# Patient Record
Sex: MALE | Race: WHITE | NOT HISPANIC OR LATINO | URBAN - METROPOLITAN AREA
[De-identification: names, ages, dates, MRNs, and addresses within clinical notes are randomized per-mention and may not be internally consistent; named-entity substitution may affect disease eponyms.]

---

## 2017-03-31 ENCOUNTER — TRANSCRIBE ORDERS (OUTPATIENT)
Dept: ADMINISTRATIVE | Facility: HOSPITAL | Age: 22
End: 2017-03-31

## 2017-03-31 ENCOUNTER — ALLSCRIPTS OFFICE VISIT (OUTPATIENT)
Dept: OTHER | Facility: OTHER | Age: 22
End: 2017-03-31

## 2017-03-31 DIAGNOSIS — G43.709 CHRONIC MIGRAINE WITHOUT AURA WITHOUT STATUS MIGRAINOSUS, NOT INTRACTABLE: ICD-10-CM

## 2017-03-31 DIAGNOSIS — E55.9 VITAMIN D DEFICIENCY: ICD-10-CM

## 2017-03-31 DIAGNOSIS — G43.701 CHRONIC MIGRAINE WITHOUT AURA WITH STATUS MIGRAINOSUS, NOT INTRACTABLE: Primary | ICD-10-CM

## 2017-04-14 ENCOUNTER — HOSPITAL ENCOUNTER (OUTPATIENT)
Dept: RADIOLOGY | Facility: HOSPITAL | Age: 22
Discharge: HOME | End: 2017-04-14
Attending: PSYCHIATRY & NEUROLOGY
Payer: COMMERCIAL

## 2017-04-14 DIAGNOSIS — G43.709 CHRONIC MIGRAINE WITHOUT AURA WITHOUT STATUS MIGRAINOSUS, NOT INTRACTABLE: ICD-10-CM

## 2017-04-14 PROCEDURE — 70551 MRI BRAIN STEM W/O DYE: CPT

## 2017-06-23 ENCOUNTER — ALLSCRIPTS OFFICE VISIT (OUTPATIENT)
Dept: OTHER | Facility: OTHER | Age: 22
End: 2017-06-23

## 2017-09-22 ENCOUNTER — GENERIC CONVERSION - ENCOUNTER (OUTPATIENT)
Dept: OTHER | Facility: OTHER | Age: 22
End: 2017-09-22

## 2018-01-10 ENCOUNTER — ALLSCRIPTS OFFICE VISIT (OUTPATIENT)
Dept: OTHER | Facility: OTHER | Age: 23
End: 2018-01-10

## 2018-01-13 NOTE — PROGRESS NOTES
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 1)    Plan    Chronic migraine without aura, Chronic tension type headache    · Protriptyline HCl - 5 MG Oral Tablet; 1 tablet QAM   Rx By: George Maxwell; Dispense: 0 Days ; #:30 Tablet; Refill: 2;For: Chronic migraine without aura, Chronic tension type headache; NUBIA = N; Verified Transmission to [id number] Last Updated By: System, [employer]; 1/10/2018 9:33:18 AM  Chronic tension type headache    · Butalbital-APAP-Caffeine -40 MG Oral Capsule; TAKE 1 TO 2 CAPSULES    EVERY 4 TO 6 HOURS AS NEEDED FOR PAIN   Rx By: George Maxwell; Dispense: 1 Days ; #:10 Capsule; Refill: 0;For: Chronic tension type headache; NUBIA = N; Call Rx      Follow-up visit in 6 months Evaluation and Treatment  Follow-up  Status: Hold For - Scheduling  Requested for: [id number]     Ordered; For: Chronic migraine without aura, Chronic tension type headache, Health Maintenance;  Ordered By: George Maxwell  Performed:   Due: [id number]; Last Updated By: Dyana Prescott; 1/10/2018 10:25:39 AM       Discussion/Summary   Discussion Summary:    Since nortriptyline causes sedation, I suggested to switch to protriptyline since the side effect profile does not usually include sedation  He agreed and I reviewed the side effects with him  He was encouraged to call back within the next few days this is not helpful or causes side effects  At the onset of a headache he will continue Excedrin migraine over-the-counter, and encouraged to limit this to 1-2 per week at the most to avoid medication overuse headache  He can take Fioricet if Excedrin fails  reports headaches are worse when he has a cold or sinus complaints; his brain MRI notes a left maxillary cyst in some mucosal inflammation  If this is persistent despite protriptyline we will have him see ENT     patient was encouraged to call the office with any questions or concerns  6 months  Patient's Capacity to Self-Care: Patient is able to Self-Care  Medication SE Review and Pt Understands Tx: Possible side effects of new medications were reviewed with the patient/guardian today  The treatment plan was reviewed with the patient/guardian  The patient/guardian understands and agrees with the treatment plan    Patient Guardian understands agrees: The treatment plan was reviewed with the patient/guardian  The patient/guardian understands and agrees with the treatment plan    Headache [place]:      The patient was counseled regarding;    Discussed side effects of all medications prescribed today to the patient in detail  See above  Patient education was completed today and we also discussed precautions for rebound headaches  --       When patient has a moderate to severe headache, they should seek rest, initiate relaxation and apply cold compresses to the head  Also recommended to the patient :  1  Maintain regular sleep schedule -- 2  Limit over the counter medications  (No more than 3 times a week)  -- 3  Maintain headache diary  -- 4  Limit caffeine to 1-2 cups a day or less  -- 5  Avoid dietary trigger  (list given to the patient and reviewed with them)  -- 6  Patient is to have regular frequent meals to prevent headache onset  Chief Complaint   Chief Complaint Free Text Note Form: Patient presents for a follow-up for chronic headache  History of Present Illness   HPI: We had the pleasure of evaluating Shauna Allison in neurological follow up today  As you know he is a 23year old right handed male  He is a  and is here today for evaluation of his headaches  last seen the patient stop nortriptyline and started Flexeril which was not effective  He restarted nortriptyline at 25 mg since the 50 mg capsule caused excessive sedation   Since restarting nortriptyline 25 mg he notes slight sedation during the day but his headaches are much improved From 4 times per week to 1 or 2 per week  At the onset of headache he typically takes Excedrin migraine which resolves the headache and it does not evolve into a migraine with nausea  He missed 1-2 days of work since last seen for headache, but overall he has been doing fairly well  a predictable trigger is when he gets a cold or viral infection  His headaches typically always get worse when he gets a cold  MRI thankfully is unremarkable, but there are some cysts and notable mucosal inflammation in the sinuses, particularly the left maxillary sinus  His headaches are usually in the frontal region any does not note any pain or pressure in the maxillary regions  is your current pain level â 0/10, no headache today started at what age â when he was 3years of age and got better when he was in high school and then got worse two years ago  He states he has headaches at time daily and then at times none  or injury prior to onset of headaches â yes, when he was in high school  Does not recall having headaches at that time   often do the headaches occur â 1-2 per week time of the day do the headaches start â usually more towards the afternoon/ evening long do the headaches last - until he takes Excedrin which usually helps you ever headache free - yes are they located - forehead, maybe into the temples at times is the intensity of pain â average 4-5/10, max 10/10- gets up to 10/10 if he does not take Excedrin warning prior to headache and how long do they last - none your usual headache - pain that is there, pressure sensation symptoms:  Decrease of appetite Photophobia, phonophobia Prefer to be alone and in a dark room, unable to work are worse if the patient: none trigger: missing meals possibly, when he gets a cold time of the year do headaches occur more frequently - possibly winter when he gets sick or a cold you seen someone else for headaches or pain â urgent care (does not have pcp)   medications do you take or have you taken for your headaches? B2 and mag, nortriptyline, flexeril Excedrin migraine, Tylenol, ibuprofen Treatments used in the past for headaches: none     of systems, Past medical history, Surgical history, Family history, Social history and Medication history were all reviewed today  Review of Systems   Neurological ROS:      Constitutional: no fever, no chills, no recent weight gain, no recent weight loss, no complaints of feeling tired, no changes in appetite  HEENT:  no sinus problems, not feeling congested, no blurred vision, no dryness of the eyes, no eye pain, no hearing loss, no tinnitus, no mouth sores, no sore throat, no hoarseness, no dysphagia, no masses, no bleeding  Cardiovascular:  no chest pain or pressure, no palpitations present, the heart rate was not rapid or irregular, no swelling in the arms or legs, no poor circulation  Respiratory:  no unusual or persistant cough, no shortness of breath with or without exertion  Gastrointestinal:  no nausea, no vomiting, no diarrhea, no abdominal pain, no changes in bowel habits, no melena, no loss of bowel control  Genitourinary:  no incontinence, no feelings of urinary urgency, no increase in frequency, no urinary hesitancy, no dysuria, no hematuria  Musculoskeletal:  no arthralgias, no myalgias, no immobility or loss of function, no head/neck/back pain, no pain while walking  Integumentary  no masses, no rash, no skin lesions, no livedo reticularis  Psychiatric:  no anxiety, no depression, no mood swings, no psychiatric hospitalizations, no sleep problems  Endocrine  no unusual weight loss or gain, no excessive urination, no excessive thirst, no hair loss or gain, no hot or cold intolerance, no menstrual period change or irregularity, no loss of sexual ability or drive, no erection difficulty, no nipple discharge  Hematologic/Lymphatic:  no unusual bleeding, no tendency for easy bruising, no clotting skin or lumps  Neurological General: headache  Neurological Mental Status:  no confusion, no mood swings, no alteration or loss of consciousness, no difficulty expressing/understanding speech, no memory problems  Neurological Cranial Nerves:  no blurry or double vision, no loss of vision, no face drooping, no facial numbness or weakness, no taste or smell loss/changes, no hearing loss or ringing, no vertigo or dizziness, no dysphagia, no slurred speech  Neurological Motor findings include:  no tremor, no twitching, no cramping(pre/post exercise), no atrophy  Neurological Coordination:  no unsteadiness, no vertigo or dizziness, no clumsiness, no problems reaching for objects  Neurological Sensory:  no numbness, no pain, no tingling, does not fall when eyes closed or taking a shower  Neurological Gait:  no difficulty walking, not falling to one side, no sensation of being pushed, has not had falls  ROS Reviewed:    ROS reviewed  Active Problems   1  Chronic migraine without aura (346 70) (G43 709)   2  Chronic tension type headache (339 12) (G44 229)   3  Low vitamin D level (268 9) (E55 9)    Current Meds    1  Butalbital-APAP-Caffeine -40 MG Oral Capsule; TAKE 1 TO 2 CAPSULES EVERY     4 TO 6 HOURS AS NEEDED FOR PAIN;     Therapy: 22Sep2017 to (Evaluate:23Sep2017); Last Rx:22Sep2017 Ordered   2  Vitamin B-2 100 MG Oral Tablet; Therapy: (Recorded:23Jun2017) to Recorded    Allergies   1   No Known Drug Allergies    Vitals   Signs   Recorded: [id number] 09:23AM   Heart Rate: 80  Respiration: 14  Systolic: 244  Diastolic: 80  Height: 6 ft 5 in  Weight: 199 lb   BMI Calculated: 23 6  BSA Calculated: 2 23  O2 Saturation: 99    Physical Exam        Constitutional      General appearance: No acute distress, well appearing and well nourished  -- (Slight eyelid edema bilaterally) well developed-- and-- well nourished  Musculoskeletal      Gait and station: Normal gait, stance and balance  Gait evaluation demonstrated a normal gait  Muscle strength: Normal strength throughout  Muscle tone: No atrophy, abnormal movements, flaccidity, cogwheeling or spasticity  Neurologic      Language: Names objects, able to repeat phrases and speaks spontaneously  Language: The evaluation was normal       2nd cranial nerve: Normal        3rd, 4th, and 6th cranial nerves: Normal        5th cranial nerve: Normal        7th cranial nerve: Normal        8th cranial nerve: Normal        9th cranial nerve: Normal        11th cranial nerve: Normal        12th cranial nerve: Normal        Sensation: Normal        Reflexes: Normal        Coordination: Normal        Mood and affect: Normal  -- appropriate  Signatures    Electronically signed by :  Rg Fritz [facility]; Petr 10 2018 10:36AM EST                       (Author)     Electronically signed by : Derek Crooks MD; Jan 12 2018  9:15AM EST                       (Author)

## 2018-01-14 VITALS
WEIGHT: 199.25 LBS | OXYGEN SATURATION: 94 % | SYSTOLIC BLOOD PRESSURE: 123 MMHG | HEART RATE: 77 BPM | DIASTOLIC BLOOD PRESSURE: 62 MMHG | RESPIRATION RATE: 16 BRPM

## 2018-01-14 VITALS
OXYGEN SATURATION: 99 % | SYSTOLIC BLOOD PRESSURE: 120 MMHG | DIASTOLIC BLOOD PRESSURE: 70 MMHG | WEIGHT: 194.5 LBS | HEART RATE: 72 BPM

## 2018-01-22 VITALS
BODY MASS INDEX: 23.5 KG/M2 | HEART RATE: 80 BPM | HEIGHT: 77 IN | WEIGHT: 199 LBS | RESPIRATION RATE: 14 BRPM | SYSTOLIC BLOOD PRESSURE: 122 MMHG | OXYGEN SATURATION: 99 % | DIASTOLIC BLOOD PRESSURE: 80 MMHG

## 2018-01-22 VITALS
HEIGHT: 77 IN | DIASTOLIC BLOOD PRESSURE: 80 MMHG | WEIGHT: 199.13 LBS | RESPIRATION RATE: 14 BRPM | SYSTOLIC BLOOD PRESSURE: 122 MMHG | BODY MASS INDEX: 23.51 KG/M2

## 2018-06-04 DIAGNOSIS — G43.709 CHRONIC MIGRAINE WITHOUT AURA WITHOUT STATUS MIGRAINOSUS, NOT INTRACTABLE: Primary | ICD-10-CM

## 2018-06-05 RX ORDER — PROTRIPTYLINE HYDROCHLORIDE 5 MG/1
TABLET, FILM COATED ORAL
Qty: 30 TABLET | Refills: 2 | Status: SHIPPED | OUTPATIENT
Start: 2018-06-05